# Patient Record
Sex: MALE | Race: WHITE | Employment: FULL TIME | URBAN - METROPOLITAN AREA
[De-identification: names, ages, dates, MRNs, and addresses within clinical notes are randomized per-mention and may not be internally consistent; named-entity substitution may affect disease eponyms.]

---

## 2017-01-31 PROCEDURE — 36415 COLL VENOUS BLD VENIPUNCTURE: CPT | Performed by: INTERNAL MEDICINE

## 2017-01-31 PROCEDURE — 84153 ASSAY OF PSA TOTAL: CPT | Performed by: INTERNAL MEDICINE

## 2017-01-31 PROCEDURE — 80050 GENERAL HEALTH PANEL: CPT | Performed by: INTERNAL MEDICINE

## 2017-01-31 PROCEDURE — 80061 LIPID PANEL: CPT | Performed by: INTERNAL MEDICINE

## 2019-02-09 PROCEDURE — 86803 HEPATITIS C AB TEST: CPT | Performed by: INTERNAL MEDICINE

## 2020-08-10 PROBLEM — I10 ESSENTIAL HYPERTENSION: Status: ACTIVE | Noted: 2020-08-10

## 2020-08-21 PROCEDURE — 88305 TISSUE EXAM BY PATHOLOGIST: CPT | Performed by: INTERNAL MEDICINE

## 2020-12-16 PROBLEM — M51.26 PROTRUSION OF LUMBAR INTERVERTEBRAL DISC: Status: ACTIVE | Noted: 2020-12-16

## 2020-12-16 PROBLEM — M54.16 LEFT LUMBAR RADICULITIS: Status: ACTIVE | Noted: 2020-12-16

## 2025-01-20 ENCOUNTER — HOSPITAL ENCOUNTER (EMERGENCY)
Facility: HOSPITAL | Age: 65
Discharge: HOME | End: 2025-01-20
Attending: STUDENT IN AN ORGANIZED HEALTH CARE EDUCATION/TRAINING PROGRAM
Payer: COMMERCIAL

## 2025-01-20 VITALS
HEIGHT: 70 IN | DIASTOLIC BLOOD PRESSURE: 98 MMHG | BODY MASS INDEX: 30.06 KG/M2 | WEIGHT: 210 LBS | HEART RATE: 68 BPM | OXYGEN SATURATION: 99 % | RESPIRATION RATE: 16 BRPM | TEMPERATURE: 98 F | SYSTOLIC BLOOD PRESSURE: 131 MMHG

## 2025-01-20 DIAGNOSIS — M54.31 SCIATICA OF RIGHT SIDE: Primary | ICD-10-CM

## 2025-01-20 PROCEDURE — 99284 EMERGENCY DEPT VISIT MOD MDM: CPT

## 2025-01-20 PROCEDURE — 99283 EMERGENCY DEPT VISIT LOW MDM: CPT

## 2025-01-20 PROCEDURE — 96372 THER/PROPH/DIAG INJ SC/IM: CPT

## 2025-01-20 RX ORDER — HYDROCODONE BITARTRATE AND ACETAMINOPHEN 5; 325 MG/1; MG/1
1-2 TABLET ORAL EVERY 6 HOURS PRN
Qty: 10 TABLET | Refills: 0 | Status: SHIPPED | OUTPATIENT
Start: 2025-01-20 | End: 2025-01-25

## 2025-01-20 RX ORDER — KETOROLAC TROMETHAMINE 15 MG/ML
15 INJECTION, SOLUTION INTRAMUSCULAR; INTRAVENOUS ONCE
Status: COMPLETED | OUTPATIENT
Start: 2025-01-20 | End: 2025-01-20

## 2025-01-20 RX ORDER — HYDROCODONE BITARTRATE AND ACETAMINOPHEN 5; 325 MG/1; MG/1
1 TABLET ORAL ONCE
Status: COMPLETED | OUTPATIENT
Start: 2025-01-20 | End: 2025-01-20

## 2025-01-20 NOTE — ED INITIAL ASSESSMENT (HPI)
Pt arrives to ED for evaluation of right hip/leg pain, which started this past Friday. Pt points to his butt and thigh when describing pain. Pt denies trauma or injury, pt does ride stationary bike, may have pushed too hard. Pt is having trouble walking.

## 2025-01-20 NOTE — ED PROVIDER NOTES
Patient Seen in: [facility] Emergency Department      History     Chief Complaint   Patient presents with    Hip Pain    Leg Pain     Stated Complaint:     Subjective:   HPI    The patient is a 64-year-old male presented to the emergency room with reports of pain in his right buttock that goes to his right thigh.  It started on Friday.  He states he feels like he has a cramp behind his thigh but that actually feels like it may have resolved as of now.  He notes he started riding a stationary bike in his house about 4 weeks ago.  He did go see a physician on Friday and was prescribed Flexeril 10 mg 3 times daily, a steroid pack, and naproxen.  He is here because his pain has not gotten any better.  He denies any bladder or bowel or saddle anesthesia.  He has been able to ambulate as well though pain feels worse with walking.  He has no pain in his back.       Objective:     Past Medical History:    Esophageal reflux    HYPOTHYROIDISM    Unspecified disorder of thyroid              Past Surgical History:   Procedure Laterality Date    Colonoscopy  9/2011    Dr Zaheer jade. IC valve polyp well seen. bx show hyperplastic tissue. sigmoid site looks good.    Colonoscopy  1/2013    nl tattoo icv/sigmoid no residual polyp. new serrated polyp desc, s/p tattoo    Colonoscopy  4/2016    nl tattoos ivc/desc/sigmoid. repeat 3 years, 2 day prep    Colonoscopy N/A 4/12/2016    Procedure: COLONOSCOPY;  Surgeon: Shadi Marie MD;  Location:  ENDOSCOPY    Colonoscopy,biopsy  9/14/09    Performed by SHADI MARIE at [facility], [facility]    Colonoscopy,biopsy  10/22/2010    Performed by SHADI MARIE at [facility]    Colonoscopy,remv lesn,snare  9/14/09    Performed by SHADI MARIE at [facility], [facility]    Colonoscopy,remv lesn,snare  11/30/2010    Performed by SHADI MARIE at [facility], [facility]    Colonoscpy, flexible, proximal to splenic flexure; w/directed submucosa  injection(s), any substance  9/14/09    Performed by PHILLIP MARIE at [facility], [facility]    Colonoscpy, flexible, proximal to splenic flexure; w/directed submucosa injection(s), any substance  11/30/2010    Performed by PHILLIP MAIRE at [facility], [facility]    Flex sig  7/2013     4-5 mm residual serrated polyp at descending tattoo, s/p biopsy and apc. New small transverse adenoma. Normal sigmoid tattoo. repeat flex sig 6 months    Flex sig  4/2014    no overt residual polyp at descending or sigmoid site, descending site s/p biopsy and APC. bx w/ benign tissue. repeat colonsocopy 2016m, 2 day prep    Remv cataract extracap,insert lens  10/14/09    Performed by YAMILETH BRITTON at [facility], [facility]    Remv cataract extracap,insert lens  11/11/09    Performed by YAMILETH BRITTON at [facility], [facility]    Upper gi endoscopy - referral  9/2011    small hiatal hernia, distal esophagitsi    Upper gi endoscopy - referral  2/2012    small hiat hernia and schatzki's ring. distal bx (-) barretts, had 60 eosinophils hpf                Social History     Socioeconomic History    Marital status:    Tobacco Use    Smoking status: Never    Smokeless tobacco: Never   Vaping Use    Vaping status: Never Used   Substance and Sexual Activity    Alcohol use: Not Currently     Comment: soc     Drug use: No                  Physical Exam     ED Triage Vitals   BP 01/20/25 0906 (!) 130/106   Pulse 01/20/25 0904 82   Resp 01/20/25 0904 16   Temp 01/20/25 0906 98.3 °F (36.8 °C)   Temp src 01/20/25 0906 Oral   SpO2 01/20/25 0904 100 %   O2 Device 01/20/25 0930 None (Room air)       Current Vitals:   Vital Signs  BP: (!) 131/98  Pulse: 68  Resp: 16  Temp: 98.3 °F (36.8 °C)  Temp src: Oral  MAP (mmHg): (!) 110    Oxygen Therapy  SpO2: 99 %  O2 Device: None (Room air)        Physical Exam  Vitals and nursing note reviewed.   Constitutional:       General: He is not in acute distress.     Appearance: Normal  appearance.   HENT:      Head: Normocephalic.      Nose: Nose normal.      Mouth/Throat:      Mouth: Mucous membranes are moist.   Eyes:      Extraocular Movements: Extraocular movements intact.      Pupils: Pupils are equal, round, and reactive to light.   Cardiovascular:      Rate and Rhythm: Normal rate and regular rhythm.      Pulses: Normal pulses.   Pulmonary:      Effort: Pulmonary effort is normal.   Abdominal:      General: Abdomen is flat. Bowel sounds are normal. There is no distension.      Palpations: Abdomen is soft.      Tenderness: There is no abdominal tenderness. There is no right CVA tenderness, left CVA tenderness, guarding or rebound.      Hernia: No hernia is present.   Musculoskeletal:         General: No swelling or tenderness. Normal range of motion.      Cervical back: Normal range of motion.   Skin:     General: Skin is warm and dry.   Neurological:      Mental Status: He is alert and oriented to person, place, and time. Mental status is at baseline.   Psychiatric:         Mood and Affect: Mood normal.             ED Course   Labs Reviewed - No data to display                MDM              Medical Decision Making  The differential includes the following  Radicular pain, sciatica, muscle spasm    Pertinent comorbidities include  Listed above    Pertinent social history includes  As listed above      External data reviewed  Reviewed the office visit note from January 17 detailing patient's right buttock pain and plan for treatment with muscle relaxants Medrol Dosepak naproxen and PT OT    Discussion of management with external providers    ER course  Patient is afebrile hemodynamically stable only slightly hypertensive on arrival here.  He has no midline tenderness to palpation thoracic or lumbar spine.  He has a mildly positive right straight leg test.  He has no palpable spasms in the hamstring.  Patient's been given a dose of Toradol and 1 [place] here.  He will be discharged with a few  tablets of Norco for severe breakthrough pain.  I did discuss that he eventually may need physical therapy if his symptoms do not appear to improve and he has been given strict return precautions after discussion of red flag symptoms.  Patient discharged with his wife.    Disposition and Plan     Clinical Impression:  1. Sciatica of right side         Disposition:  Discharge  1/20/2025  9:23 am    Follow-up:  Forest Brown MD  [address] [id number] [phone]    Follow up            Medications Prescribed:  Discharge Medication List as of 1/20/2025  9:44 AM        START taking these medications    Details   HYDROcodone-acetaminophen 5-325 MG Oral Tab Take 1-2 tablets by mouth every 6 (six) hours as needed., Normal, Disp-10 tablet, R-0      Naloxone HCl 4 MG/0.1ML Nasal Liquid 4 mg by Nasal route as needed. If patient remains unresponsive, repeat dose in other nostril 2-5 minutes after first dose., Normal, Disp-1 kit, R-0                 Supplementary Documentation: